# Patient Record
Sex: FEMALE | Race: WHITE
[De-identification: names, ages, dates, MRNs, and addresses within clinical notes are randomized per-mention and may not be internally consistent; named-entity substitution may affect disease eponyms.]

---

## 2022-05-23 ENCOUNTER — HOSPITAL ENCOUNTER (EMERGENCY)
Dept: HOSPITAL 97 - ER | Age: 67
Discharge: HOME | End: 2022-05-23
Payer: COMMERCIAL

## 2022-05-23 VITALS — SYSTOLIC BLOOD PRESSURE: 190 MMHG | OXYGEN SATURATION: 91 % | DIASTOLIC BLOOD PRESSURE: 71 MMHG

## 2022-05-23 VITALS — TEMPERATURE: 98.6 F

## 2022-05-23 DIAGNOSIS — I10: ICD-10-CM

## 2022-05-23 DIAGNOSIS — R10.84: Primary | ICD-10-CM

## 2022-05-23 DIAGNOSIS — Z88.8: ICD-10-CM

## 2022-05-23 LAB
ALBUMIN SERPL BCP-MCNC: 3.2 G/DL (ref 3.4–5)
ALP SERPL-CCNC: 39 U/L (ref 45–117)
ALT SERPL W P-5'-P-CCNC: 33 U/L (ref 12–78)
AST SERPL W P-5'-P-CCNC: 23 U/L (ref 15–37)
BUN BLD-MCNC: 16 MG/DL (ref 7–18)
GLUCOSE SERPLBLD-MCNC: 141 MG/DL (ref 74–106)
HCT VFR BLD CALC: 44.2 % (ref 36–45)
LIPASE SERPL-CCNC: 76 U/L (ref 73–393)
LYMPHOCYTES # SPEC AUTO: 1.5 K/UL (ref 0.7–4.9)
PMV BLD: 9.2 FL (ref 7.6–11.3)
POTASSIUM SERPL-SCNC: 3.4 MMOL/L (ref 3.5–5.1)
RBC # BLD: 4.74 M/UL (ref 3.86–4.86)

## 2022-05-23 PROCEDURE — 96374 THER/PROPH/DIAG INJ IV PUSH: CPT

## 2022-05-23 PROCEDURE — 99284 EMERGENCY DEPT VISIT MOD MDM: CPT

## 2022-05-23 PROCEDURE — 96361 HYDRATE IV INFUSION ADD-ON: CPT

## 2022-05-23 PROCEDURE — 83690 ASSAY OF LIPASE: CPT

## 2022-05-23 PROCEDURE — 81015 MICROSCOPIC EXAM OF URINE: CPT

## 2022-05-23 PROCEDURE — 81003 URINALYSIS AUTO W/O SCOPE: CPT

## 2022-05-23 PROCEDURE — 80053 COMPREHEN METABOLIC PANEL: CPT

## 2022-05-23 PROCEDURE — 87088 URINE BACTERIA CULTURE: CPT

## 2022-05-23 PROCEDURE — 85025 COMPLETE CBC W/AUTO DIFF WBC: CPT

## 2022-05-23 PROCEDURE — 74177 CT ABD & PELVIS W/CONTRAST: CPT

## 2022-05-23 PROCEDURE — 87086 URINE CULTURE/COLONY COUNT: CPT

## 2022-05-23 PROCEDURE — 36415 COLL VENOUS BLD VENIPUNCTURE: CPT

## 2022-05-23 NOTE — ER
Nurse's Notes                                                                                     

 [facility]                                                                 

Name: Amparo Momin                                                                               

Age: 67 yrs                                                                                       

Sex: Female                                                                                       

: 1955                                                                                   

MRN: [id number]                                                                                   

Arrival Date: 2022                                                                          

Time: 11:00                                                                                       

Account#: [id number]                                                                            

Bed 3                                                                                             

Private MD:                                                                                       

Diagnosis: Abdominal pain, Generalized;Essential (primary) hypertension                           

                                                                                                  

Presentation:                                                                                     

                                                                                             

11:15 Chief complaint: EMS states: Toned out for abdominal pain x 1 day. Pt reports           jl7 

      intermittent abdominal pain x 1 year. Coronavirus screen: At this time, the client does     

      not indicate any symptoms associated with coronavirus-19. Ebola Screen: No symptoms or      

      risks identified at this time. Initial Sepsis Screen: Does the patient meet any 2           

      criteria? No. Patient's initial sepsis screen is negative. Does the patient have a          

      suspected source of infection? No. Patient's initial sepsis screen is negative. Risk        

      Assessment: Do you want to hurt yourself or someone else? Patient reports no desire to      

      harm self or others. Onset of symptoms was May 2021.                                        

11:15 Method Of Arrival: Ambulatory                                                           [facility] 

11:15 Acuity: VASYL 3                                                                           7 

11:15 Care prior to arrival: Medication(s) given: zofran 4 mg, IV initiated. 22 GA, in the    jl7 

      right hand.                                                                                 

                                                                                                  

Triage Assessment:                                                                                

11:17 General: Appears in no apparent distress. uncomfortable, Behavior is calm, cooperative, jl7 

      appropriate for age. Pain: Complains of pain in right lower quadrant and right upper        

      quadrant Pain currently is 2 out of 10 on a pain scale. at worst was 10 out of 10 on a      

      pain scale. Is intermittent, episodic, Aggravated by repositioning. Neuro: Level of         

      Consciousness is awake, alert, obeys commands, Oriented to person, place, time,             

      situation. Cardiovascular: Patient's skin is warm and dry. Respiratory: Airway is           

      patent Respiratory effort is even, unlabored, Respiratory pattern is regular,               

      symmetrical. GI: Abdomen is round non-distended, Abd is soft and non tender. Derm: Skin     

      is pink, warm \T\ dry.                                                                      

                                                                                                  

Historical:                                                                                       

- Allergies:                                                                                      

11:17 amlodipine;                                                                             jl7 

- Home Meds:                                                                                      

11:17 losartan-hydrochlorothiazide 100-25 mg oral tab 1 tab once daily [Active];              jl7 

- PMHx:                                                                                           

11:17 Hypertensive disorder;                                                                  jl7 

                                                                                                  

- Immunization history:: Client reports having NOT received the Covid vaccine.                    

- Social history:: Smoking status: Patient denies any tobacco usage or history of.                

                                                                                                  

                                                                                                  

Screenin:19 Abuse screen: Denies threats or abuse. Denies injuries from another. Nutritional        jl7 

      screening: No deficits noted. Tuberculosis screening: No symptoms or risk factors           

      identified. Fall Risk IV access (20 points). Total Juan Fall Scale indicates No Risk       

      (0-24 pts).                                                                                 

                                                                                                  

Assessment:                                                                                       

11:15 General: See triage.                                                                    jl7 

13:00 Reassessment: Assisted pt to restroom.                                                  jl7 

14:00 Reassessment: Pt reports feeling better.                                                jl7 

                                                                                                  

Vital Signs:                                                                                      

11:15  / 92; Pulse 64; Resp 15; Temp 98.6; Pulse Ox 96% on R/A; Weight 83.91 kg; Height jl7 

      5 ft. 2 in. (157.48 cm); Pain 2/10;                                                         

11:57  / 71; Pulse 66; Resp 15; Pulse Ox 91% ;                                          jl7 

15:16  / 79; Pulse 58; Resp 15; Pulse Ox 95% ;                                          jl7 

11:15 Body Mass Index 33.84 (83.91 kg, 157.48 cm)                                             jl7 

                                                                                                  

ED Course:                                                                                        

11:00 Patient arrived in ED.                                                                  ms3 

11:01 Jc Stinson DO is Attending Physician.                                                ms3 

11:01 Regla Stapleton, RN is Primary Nurse.                                                      jl7 

11:10 Initial lab(s) drawn, by me, sent to lab. Inserted saline lock: 20 gauge in left        aa5 

      antecubital area, using aseptic technique. Blood collected.                                 

11:17 Triage completed.                                                                       jl7 

11:17 Arm band placed on right wrist.                                                         jl7 

11:19 Patient has correct armband on for positive identification. Bed in low position. Call   jl7 

      light in reach. Side rails up X2. Pulse ox on. NIBP on. Warm blanket given.                 

13:12 CT Abd/Pelvis - IV Contrast Only In Process Unspecified.                                EDMS

15:13 No provider procedures requiring assistance completed. IV discontinued, intact,         jl7 

      bleeding controlled, No redness/swelling at site. Pressure dressing applied.                

                                                                                                  

Administered Medications:                                                                         

11:10 Drug: NS 0.9% 1000 ml Route: IV; Rate: 1 bolus; Site: left antecubital;                 aa5 

12:30 Follow up: IV Status: Completed infusion; IV Intake: 1000ml                             jl7 

11:10 Drug: Pepcid (famotidine) 20 mg Route: IVP; Site: left antecubital;                     aa5 

12:00 Follow up: Response: No adverse reaction                                                jl7 

12:02 Not Given (Patient Refused): Zofran (Ondansetron) 4 mg IVP once; over 2 minutes         jl7 

12:04 Drug: Losartan 100 mg Route: PO;                                                        jl7 

12:30 Follow up: Response: No adverse reaction                                                jl7 

12:04 Drug: Hydrochlorothiazide 25 mg Route: PO;                                              jl7 

12:30 Follow up: Response: No adverse reaction                                                jl7 

                                                                                                  

                                                                                                  

Medication:                                                                                       

11:19 VIS not applicable for this client.                                                     jl7 

                                                                                                  

Intake:                                                                                           

12:30 IV: 1000ml; Total: 1000ml.                                                              jl7 

                                                                                                  

Outcome:                                                                                          

15:01 Discharge ordered by MD.                                                                ms3 

15:13 Discharged to home via wheelchair, with family.                                         jl7 

15:13 Condition: stable                                                                           

15:13 Discharge instructions given to patient, family, Instructed on discharge instructions,      

      follow up and referral plans. Demonstrated understanding of instructions, follow-up         

      care.                                                                                       

15:13 Patient left the ED.                                                                    jl7 

                                                                                                  

Signatures:                                                                                       

Dispatcher [employer]                           EDMS                                                 

Darlene Calloway RN                     RN   aa5                                                  

Regla Stapleton RN                        RN   jl7                                                  

Jc Stinson DO                        DO   ms3                                                  

                                                                                                  

**************************************************************************************************

## 2022-05-23 NOTE — EDPHYS
Physician Documentation                                                                           

 [facility]                                                                 

Name: Amparo Momin                                                                               

Age: 67 yrs                                                                                       

Sex: Female                                                                                       

: 1955                                                                                   

MRN: [id number]                                                                                   

Arrival Date: 2022                                                                          

Time: 11:00                                                                                       

Account#: [id number]                                                                            

Bed 3                                                                                             

Private MD:                                                                                       

ED Physician Jc Stinson                                                                         

HPI:                                                                                              

                                                                                             

11:03 This 67 yrs old Female presents to ER via Unassigned with complaints of abdominal pain. ms3 

11:03 The patient presents with abdominal pain Right side of abdomen. Onset: The              ms3 

      symptoms/episode began/occurred yesterday. The symptoms do not radiate. Associated          

      signs and symptoms: Pertinent positives: nausea, Pertinent negatives: diarrhea,             

      vomiting. The symptoms are described as achy. Modifying factors: The symptoms are           

      alleviated by nothing, the symptoms are aggravated by Sitting on toilette to urinate.       

      Severity of pain: At its worst the pain was severe in the emergency department the pain     

      has improved is a 2 / 10.                                                                   

                                                                                                  

Historical:                                                                                       

- Allergies:                                                                                      

11:17 amlodipine;                                                                             jl7 

- Home Meds:                                                                                      

11:17 losartan-hydrochlorothiazide 100-25 mg oral tab 1 tab once daily [Active];              jl7 

- PMHx:                                                                                           

11:17 Hypertensive disorder;                                                                  jl7 

                                                                                                  

- Immunization history:: Client reports having NOT received the Covid vaccine.                    

- Social history:: Smoking status: Patient denies any tobacco usage or history of.                

                                                                                                  

                                                                                                  

ROS:                                                                                              

11:03 Constitutional: Negative for fever, and chills. Neck: Negative for injury, pain, and    ms3 

      swelling, Cardiovascular: Negative for chest pain, and palpitations. Respiratory:           

      Negative for shortness of breath, cough, wheezing, and pleuritic chest pain,                

      MS/Extremity: Negative for injury and deformity, Skin: Negative for injury, rash, and       

      discoloration.                                                                              

11:03 Abdomen/GI: Positive for abdominal pain.                                                    

11:03 All other systems are negative.                                                             

                                                                                                  

Exam:                                                                                             

11:03 Constitutional:  This is a well developed, well nourished patient who is awake, alert,  ms3 

      and in no acute distress. Head/Face:  Normocephalic, atraumatic. Neck:  Trachea             

      midline, no cervical lymphadenopathy.  Supple, full range of motion without nuchal          

      rigidity, or vertebral point tenderness.  No Meningismus. Chest/axilla:  Normal chest       

      wall appearance and motion.  Nontender with no deformity.   Cardiovascular:  Regular        

      rate and rhythm with a normal S1 and S2.  No gallops, murmurs, or rubs.  Normal PMI, no     

      JVD.  No pulse deficits. Respiratory:  Lungs have equal breath sounds bilaterally,          

      clear to auscultation and percussion.  No rales, rhonchi or wheezes noted.  No              

      increased work of breathing, no retractions or nasal flaring. Back:  No spinal              

      tenderness.  No costovertebral tenderness.  Full range of motion. Skin:  Warm, dry with     

      normal turgor.  Normal color with no rashes, no lesions, and no evidence of cellulitis.     

11:03 Abdomen/GI: Inspection: abdomen appears normal, Bowel sounds: normal, Palpation: mild       

      abdominal tenderness, in the right upper quadrant and right lower quadrant, Indicators:     

      Crespo's sign is negative.                                                                  

                                                                                                  

Vital Signs:                                                                                      

11:15  / 92; Pulse 64; Resp 15; Temp 98.6; Pulse Ox 96% on R/A; Weight 83.91 kg; Height jl7 

      5 ft. 2 in. (157.48 cm); Pain 2/10;                                                         

11:57  / 71; Pulse 66; Resp 15; Pulse Ox 91% ;                                          jl7 

15:16  / 79; Pulse 58; Resp 15; Pulse Ox 95% ;                                          jl7 

11:15 Body Mass Index 33.84 (83.91 kg, 157.48 cm)                                             jl7 

                                                                                                  

MDM:                                                                                              

11:01 Patient medically screened.                                                             ms3 

11:03 Differential diagnosis: appendicitis, bowel obstruction, cholecystitis, Cholelithiasis, ms3 

      gastritis, non-specific abd pain.                                                           

22:04 Data reviewed: vital signs, nurses notes, lab test result(s), radiologic studies.       ms3 

      Counseling: I had a detailed discussion with the patient and/or guardian regarding: the     

      historical points, exam findings, and any diagnostic results supporting the                 

      discharge/admit diagnosis, lab results, radiology results, the need for outpatient          

      follow up, to return to the emergency department if symptoms worsen or persist or if        

      there are any questions or concerns that arise at home. ED course: Discussed labs, CT ,     

      PE findings with patient. Patient to follow up with PMD in 2-3 days. Patient                

      understands/ agrees with plan. All questions answered. Return precautions given to          

      include worsening symptoms, or any other concerns. Patient is improved, in NAD,             

      non-toxic appearing, ambulatory in ED, speaking full sentences..                            

                                                                                                  

                                                                                             

11:03 Order name: CBC with Diff; Complete Time: 14:05                                         ms3 

                                                                                             

11:03 Order name: CMP; Complete Time: 14:05                                                   ms3 

                                                                                             

11:03 Order name: Lipase; Complete Time: 14:05                                                ms3 

                                                                                             

11:03 Order name: Urine Microscopic Only; Complete Time: 14:05                                ms3 

                                                                                             

13:11 Order name: Urine Dipstick-Ancillary; Complete Time: 14:05                              EDMS

                                                                                             

13:29 Order name: Urine Culture                                                               EDMS

                                                                                             

11:03 Order name: CT Abd/Pelvis - IV Contrast Only                                            ms3 

                                                                                             

11:03 Order name: IV Saline Lock; Complete Time: 11:15                                        ms3 

                                                                                             

11:03 Order name: Labs collected and sent; Complete Time: 11:15                               ms3 

                                                                                             

11:24 Order name: Labs - recollect needed: light green hemolyzed; Complete Time: 11:52        iw  

                                                                                                  

Administered Medications:                                                                         

11:10 Drug: NS 0.9% 1000 ml Route: IV; Rate: 1 bolus; Site: left antecubital;                 aa5 

12:30 Follow up: IV Status: Completed infusion; IV Intake: 1000ml                             jl7 

11:10 Drug: Pepcid (famotidine) 20 mg Route: IVP; Site: left antecubital;                     aa5 

12:00 Follow up: Response: No adverse reaction                                                jl7 

12:02 Not Given (Patient Refused): Zofran (Ondansetron) 4 mg IVP once; over 2 minutes         jl7 

12:04 Drug: Losartan 100 mg Route: PO;                                                        jl7 

12:30 Follow up: Response: No adverse reaction                                                jl7 

12:04 Drug: Hydrochlorothiazide 25 mg Route: PO;                                              jl7 

12:30 Follow up: Response: No adverse reaction                                                jl7 

                                                                                                  

                                                                                                  

Disposition Summary:                                                                              

22 15:01                                                                                    

Discharge Ordered                                                                                 

      Location: Home                                                                          ms3 

      Condition: Stable                                                                       ms3 

      Diagnosis                                                                                   

        - Abdominal pain, Generalized                                                         ms3 

        - Essential (primary) hypertension                                                    ms3 

      Followup:                                                                               ms3 

        - With: Private Physician                                                                  

        - When: 2 - 3 days                                                                         

        - Reason:                                                                                  

      Discharge Instructions:                                                                     

        - Discharge Summary Sheet                                                             ms3 

        - Abdominal Pain, Adult                                                               ms3 

      Forms:                                                                                      

        - Medication Reconciliation Form                                                      ms3 

        - Thank You Letter                                                                    ms3 

        - Antibiotic Education                                                                ms3 

        - Prescription Opioid Use                                                             ms3 

Signatures:                                                                                       

Dispatcher [employer]                           Gabbi Carias, RN                     RN   iw                                                   

Darlene Calloway, RN                     RN   aa5                                                  

Regla Stapleton RN                        RN   jl7                                                  

Jc Stinson DO DO   ms3                                                  

                                                                                                  

**************************************************************************************************

## 2022-05-23 NOTE — RAD REPORT
EXAM DESCRIPTION:  CT - Abdomen   Pelvis W Contrast - 5/23/2022 1:10 pm

 

CLINICAL HISTORY:  Right sided abdominal pain

 

COMPARISON:  No comparisons

 

TECHNIQUE:  Biphasic, helical CT imaging of the abdomen and pelvis was performed following 100 ml non
-ionic IV contrast.

 

No oral contrast administered.

 

All CT scans are performed using dose optimization technique as appropriate and may include automated
 exposure control or mA/KV adjustment according to patient size.

 

FINDINGS:  No suspicious findings in the lung bases.

 

The liver, spleen, and pancreas show no suspicious findings. Gallbladder and biliary tree are also wi
thout suspicious finding. No portal vein abnormality. Liver attenuation is borderline fatty infiltrat
ed.

 

Symmetric renal function is seen with no hydronephrosis or suspicious renal mass. No pyelonephritis o
r acute parenchymal process. No bladder abnormalities. No adrenal abnormalities. Lobulated multi fibr
oid uterus is present. Approximately 4 centimeter fibroid is seen. Additional fibroid is seen in the 
lower uterus approximately 3-3.5 cm. No ovarian or fallopian tubes significant finding.

 

No dilated bowel loops or bowel wall thickening. The appendix is unremarkable. Large stool volume dis
tends does not dilate the rectum. An acute or emergent bowel process is not seen. No free air, free f
luid or inflammatory stranding.  No hernia, mass or bulky lymphadenopathy.

 

No suspicious bony findings.

 

 

IMPRESSION:  Contrast enhanced CT abdomen and pelvis showing appendicitis or other acute or emergent 
finding.

 

Nonacute findings detailed in the body of the report.